# Patient Record
Sex: FEMALE | Race: WHITE | NOT HISPANIC OR LATINO | URBAN - METROPOLITAN AREA
[De-identification: names, ages, dates, MRNs, and addresses within clinical notes are randomized per-mention and may not be internally consistent; named-entity substitution may affect disease eponyms.]

---

## 2019-11-06 ENCOUNTER — EMERGENCY (EMERGENCY)
Facility: HOSPITAL | Age: 29
LOS: 1 days | Discharge: HOME | End: 2019-11-06
Attending: EMERGENCY MEDICINE
Payer: COMMERCIAL

## 2019-11-06 VITALS — SYSTOLIC BLOOD PRESSURE: 110 MMHG | HEART RATE: 88 BPM | DIASTOLIC BLOOD PRESSURE: 78 MMHG

## 2019-11-06 VITALS
DIASTOLIC BLOOD PRESSURE: 85 MMHG | SYSTOLIC BLOOD PRESSURE: 124 MMHG | HEART RATE: 90 BPM | OXYGEN SATURATION: 99 % | HEIGHT: 65 IN | TEMPERATURE: 98 F | RESPIRATION RATE: 20 BRPM | WEIGHT: 139.99 LBS

## 2019-11-06 PROCEDURE — 99284 EMERGENCY DEPT VISIT MOD MDM: CPT | Mod: 25

## 2019-11-06 PROCEDURE — 90471 IMMUNIZATION ADMIN: CPT

## 2019-11-06 PROCEDURE — 99284 EMERGENCY DEPT VISIT MOD MDM: CPT

## 2019-11-06 PROCEDURE — 96374 THER/PROPH/DIAG INJ IV PUSH: CPT

## 2019-11-06 PROCEDURE — 90715 TDAP VACCINE 7 YRS/> IM: CPT

## 2019-11-06 RX ORDER — TETANUS TOXOID, REDUCED DIPHTHERIA TOXOID AND ACELLULAR PERTUSSIS VACCINE, ADSORBED 5; 2.5; 8; 8; 2.5 [IU]/.5ML; [IU]/.5ML; UG/.5ML; UG/.5ML; UG/.5ML
0.5 SUSPENSION INTRAMUSCULAR ONCE
Refills: 0 | Status: COMPLETED | OUTPATIENT
Start: 2019-11-06 | End: 2019-11-06

## 2019-11-06 RX ORDER — AMPICILLIN SODIUM AND SULBACTAM SODIUM 250; 125 MG/ML; MG/ML
3 INJECTION, POWDER, FOR SUSPENSION INTRAMUSCULAR; INTRAVENOUS ONCE
Refills: 0 | Status: COMPLETED | OUTPATIENT
Start: 2019-11-06 | End: 2019-11-06

## 2019-11-06 RX ADMIN — AMPICILLIN SODIUM AND SULBACTAM SODIUM 200 GRAM(S): 250; 125 INJECTION, POWDER, FOR SUSPENSION INTRAMUSCULAR; INTRAVENOUS at 10:33

## 2019-11-06 RX ADMIN — AMPICILLIN SODIUM AND SULBACTAM SODIUM 3 GRAM(S): 250; 125 INJECTION, POWDER, FOR SUSPENSION INTRAMUSCULAR; INTRAVENOUS at 11:03

## 2019-11-06 RX ADMIN — TETANUS TOXOID, REDUCED DIPHTHERIA TOXOID AND ACELLULAR PERTUSSIS VACCINE, ADSORBED 0.5 MILLILITER(S): 5; 2.5; 8; 8; 2.5 SUSPENSION INTRAMUSCULAR at 10:33

## 2019-11-06 NOTE — ED ADULT NURSE NOTE - OBJECTIVE STATEMENT
s/p scratched by her cat, has an laceration on her left eyelid and left lower arm scratches. Rt lower arm old scratch marks noted.

## 2019-11-06 NOTE — ED PROVIDER NOTE - CLINICAL SUMMARY MEDICAL DECISION MAKING FREE TEXT BOX
28 y/o F pt presents to ED with laceration to L eye lid, to be repaired by plastics. Will update T-dap, no need for rabies vaccination as cat is owned and vaccinated.

## 2019-11-06 NOTE — ED PROVIDER NOTE - PHYSICAL EXAMINATION
Skin: 1cm jagged laceration to L upper eye lid.     Eye: Globe appears normal, extraocular eye movements intact, conjunctiva injection, no photophobia.

## 2019-11-06 NOTE — ED ADULT NURSE NOTE - NSIMPLEMENTINTERV_GEN_ALL_ED
Implemented All Universal Safety Interventions:  Bow to call system. Call bell, personal items and telephone within reach. Instruct patient to call for assistance. Room bathroom lighting operational. Non-slip footwear when patient is off stretcher. Physically safe environment: no spills, clutter or unnecessary equipment. Stretcher in lowest position, wheels locked, appropriate side rails in place.

## 2019-11-06 NOTE — ED PROVIDER NOTE - PROGRESS NOTE DETAILS
Spoke with Dr. Paredes. He will see pt in about 2hrs, and requested IV fluids and abx. Patient is resting comfortably, NAD. Patient's laceration repaired by Dr. Paredes. Patient will f/u in his office.

## 2019-11-06 NOTE — CONSULT NOTE ADULT - ASSESSMENT
A/P: 29y y.o with laceration s/p repair.  - Head elevation  - Tylenol pain prn  - Tetanus  - Abx  - Bacitracin BID  - F/U 5 days  - Patient and family educated on warning signs to prompt ER return pending ER discharge      Thank You  Alexx Paredes MD  Plastic Surgery  [phone]

## 2019-11-06 NOTE — ED PROVIDER NOTE - PATIENT PORTAL LINK FT
You can access the FollowMyHealth Patient Portal offered by [facility] by registering at the following website: [email or web address]. By joining [employer]’s FollowMyHealth portal, you will also be able to view your health information using other applications (apps) compatible with our system.

## 2019-11-06 NOTE — CONSULT NOTE ADULT - SUBJECTIVE AND OBJECTIVE BOX
CARINE SANCHEZ  [id number]      29y y/o presents to the ER with laceration after being scratched by her cat. Patient denies LOC, changes in vision, changes in teeth alignment, nausea or emesis.  Patient denies other injuries.  Patient reports vaccinations of cat up to date.     MEWS Score  No pertinent past medical history  Left eyelid laceration, initial encounter  No significant past surgical history  W- LEFT EYE LAC      No Known Allergies      T(C): 36.6 (11-06-19 @ 09:50), Max: 36.6 (11-06-19 @ 09:50)  HR: 88 (11-06-19 @ 14:00) (88 - 90)  BP: 110/78 (11-06-19 @ 14:00) (110/78 - 124/85)  RR: 20 (11-06-19 @ 09:50) (20 - 20)  SpO2: 99% (11-06-19 @ 09:50) (99% - 99%)    NAD  HEENT:  EOMi.  PERRLA.  No facial tenderness.  Intranasal: No injuries.  Intraoral: No injuries.  NO loose dentures.  Laceration: 2.5cm in left upper eyelid deep to subcutaneous layer with necrotic tissue.  CN2-12 intact.        Procedure:  Left upper eyelid field block.  Washout of wound with betadine.  Excisional debridement skin including subcutaneous layer. Skin flaps widely undermined, advanced, repaired with 6.0 fast absorb.  Bacitracin applied.

## 2019-11-06 NOTE — ED PROVIDER NOTE - OBJECTIVE STATEMENT
28 y/o F pt with no pertinent PMHx and PSHx presents to ED c/o cat scratch on her L eye lid this morning. Pt relates that her last tetanus shot was more than 10 years ago, but denies pain to eye, changes in vision, any other injuries, and any other acute complaints.

## 2019-11-06 NOTE — ED ADULT NURSE NOTE - CHPI ED NUR SYMPTOMS NEG
no fever/no drainage/no purulent drainage/no redness/no vomiting/no chills/no bleeding at site/no blood in mucus/no pain/no rectal pain